# Patient Record
Sex: FEMALE
[De-identification: names, ages, dates, MRNs, and addresses within clinical notes are randomized per-mention and may not be internally consistent; named-entity substitution may affect disease eponyms.]

---

## 2019-08-08 VITALS — DIASTOLIC BLOOD PRESSURE: 59 MMHG | SYSTOLIC BLOOD PRESSURE: 122 MMHG

## 2019-08-08 LAB
APTT PPP: 31.7 SEC (ref 26.3–35.5)
BASOPHILS NFR BLD AUTO: 0.6 % (ref 0–5)
BUN SERPL-MCNC: 12 MG/DL (ref 7–18)
CHLORIDE SERPL-SCNC: 101 MMOL/L (ref 101–111)
CO2 SERPL-SCNC: 28 MMOL/L (ref 21–32)
CREAT SERPL-MCNC: 0.7 MG/DL (ref 0.5–1.5)
EOSINOPHIL NFR BLD AUTO: 0.8 % (ref 0–8)
ERYTHROCYTE [DISTWIDTH] IN BLOOD BY AUTOMATED COUNT: 16 % (ref 11–15.5)
GFR SERPL CREATININE-BSD FRML MDRD: 100 ML/MIN (ref 60–?)
GLUCOSE SERPL-MCNC: 91 MG/DL (ref 70–105)
HCT VFR BLD AUTO: 38.9 % (ref 36–48)
INR PPP: 0.95 (ref 0.85–1.15)
LYMPHOCYTES NFR SPEC AUTO: 13 % (ref 21–51)
MCH RBC QN AUTO: 27.6 PG (ref 27–33)
MCHC RBC AUTO-ENTMCNC: 32.8 G/DL (ref 32–36)
MCV RBC AUTO: 84.2 FL (ref 79–99)
MONOCYTES NFR BLD AUTO: 3.2 % (ref 3–13)
NEUTROPHILS NFR BLD AUTO: 82.4 % (ref 40–77)
NRBC BLD MANUAL-RTO: 0 % (ref 0–0.19)
PLATELET # BLD AUTO: 580 K/UL (ref 130–400)
POTASSIUM SERPL-SCNC: 4.4 MMOL/L (ref 3.5–5.1)
PROTHROMBIN TIME: 10 SEC (ref 9.6–11.6)
RBC # BLD AUTO: 4.62 MIL/UL (ref 4–5.5)
SODIUM SERPL-SCNC: 137 MMOL/L (ref 136–145)
WBC # BLD AUTO: 18.7 K/UL (ref 4.8–10.8)

## 2019-08-09 ENCOUNTER — HOSPITAL ENCOUNTER (OUTPATIENT)
Dept: HOSPITAL 90 - DAH | Age: 39
End: 2019-08-09
Attending: SURGERY
Payer: COMMERCIAL

## 2019-08-09 VITALS — SYSTOLIC BLOOD PRESSURE: 123 MMHG | DIASTOLIC BLOOD PRESSURE: 74 MMHG

## 2019-08-09 VITALS — DIASTOLIC BLOOD PRESSURE: 78 MMHG | SYSTOLIC BLOOD PRESSURE: 124 MMHG

## 2019-08-09 VITALS — SYSTOLIC BLOOD PRESSURE: 118 MMHG | DIASTOLIC BLOOD PRESSURE: 64 MMHG

## 2019-08-09 VITALS — HEIGHT: 65 IN | BODY MASS INDEX: 34.12 KG/M2 | WEIGHT: 204.8 LBS

## 2019-08-09 VITALS — DIASTOLIC BLOOD PRESSURE: 72 MMHG | SYSTOLIC BLOOD PRESSURE: 128 MMHG

## 2019-08-09 VITALS — SYSTOLIC BLOOD PRESSURE: 119 MMHG | DIASTOLIC BLOOD PRESSURE: 61 MMHG

## 2019-08-09 VITALS — SYSTOLIC BLOOD PRESSURE: 124 MMHG | DIASTOLIC BLOOD PRESSURE: 80 MMHG

## 2019-08-09 DIAGNOSIS — Z98.890: ICD-10-CM

## 2019-08-09 DIAGNOSIS — Z79.899: ICD-10-CM

## 2019-08-09 DIAGNOSIS — K81.0: Primary | ICD-10-CM

## 2019-08-09 DIAGNOSIS — Z79.01: ICD-10-CM

## 2019-08-09 PROCEDURE — 85730 THROMBOPLASTIN TIME PARTIAL: CPT

## 2019-08-09 PROCEDURE — 80048 BASIC METABOLIC PNL TOTAL CA: CPT

## 2019-08-09 PROCEDURE — 85610 PROTHROMBIN TIME: CPT

## 2019-08-09 PROCEDURE — 84703 CHORIONIC GONADOTROPIN ASSAY: CPT

## 2019-08-09 PROCEDURE — 85025 COMPLETE CBC W/AUTO DIFF WBC: CPT

## 2019-08-09 PROCEDURE — 47531 INJECTION FOR CHOLANGIOGRAM: CPT

## 2019-08-09 PROCEDURE — 36415 COLL VENOUS BLD VENIPUNCTURE: CPT

## 2019-08-09 NOTE — NUR
PATIENT DISCHARGED 



DISCHARGE INSTRUCTIONS PROVIDED TO PATIENT'S SPOUSE. PROVIDED FOLLOW UP APPOINTMENT AND 
TUBE/DRAIN CARE. ALL QUESTIONS/CONCERNS ADDRESSED. PATIENT'S SPOUSE VERBALIZED UNDERSTANDING 
OF INSTRUCTIONS. PATIENT TAKEN TO PRIVATE VEHICLE VIA WHEELCHAIR. DRAIN TO RIGHT LOWER 
ABDOMEN CLAMPED AND DRESSING DRY AND INTACT.

## 2019-08-09 NOTE — NUR
PATIENT TRANSFERRED



PATIENT TAKEN TO CATH LAB BY HILTON PETTY AND MALIK JACKSON RN VIA BED. FAMILY WAITING IN ROOM.

## 2019-08-09 NOTE — NUR
PATIENT BACK



PATIENT BROUGHT BACK FROM CATH LAB BY HILTON PETTY. PATIENT AAOX3, RESPIRATION UNLABORED, VITAL 
SIGNS STABLE, DENIES ANY PAIN. DRAIN TO RIGHT LOWER ABDOMEN WITH CLAMP. DRESSING DRY AND 
INTACT. SIDE RAILS UPX2, BED IN LOWEST POSITION. FAMILY AT BEDSIDE.

## 2019-08-09 NOTE — NUR
PATIENT ARRIVED



PATIENT ARRIVED TO DAY PATIENT ACCOMPANIED BY MOTHER. PATIENT AAOX3, RESPIRATIONS UNLABORED, 
VITAL SIGNS STABLE. DENIES ANY PAIN. CHOLECYSTOSTOMY DRAIN TO RIGHT LOWER ABDOMEN DRAINING 
DARK ORANGE FLUID. PROCEDURE VERIFIED WITH PATIENT AND CONFIRMED. ALL QUESTIONS/CONCERNS 
ADDRESSED.

## 2023-08-21 ENCOUNTER — HOSPITAL ENCOUNTER (OUTPATIENT)
Dept: HOSPITAL 90 - RAH | Age: 43
Discharge: HOME | End: 2023-08-21
Attending: OBSTETRICS & GYNECOLOGY
Payer: COMMERCIAL

## 2023-08-21 DIAGNOSIS — Z12.31: Primary | ICD-10-CM

## 2023-08-21 PROCEDURE — 77067 SCR MAMMO BI INCL CAD: CPT
